# Patient Record
Sex: MALE | Race: WHITE | Employment: FULL TIME | URBAN - METROPOLITAN AREA
[De-identification: names, ages, dates, MRNs, and addresses within clinical notes are randomized per-mention and may not be internally consistent; named-entity substitution may affect disease eponyms.]

---

## 2020-05-13 ENCOUNTER — HOSPITAL ENCOUNTER (EMERGENCY)
Age: 35
Discharge: HOME | End: 2020-05-13
Payer: COMMERCIAL

## 2020-05-13 ENCOUNTER — APPOINTMENT (OUTPATIENT)
Dept: GENERAL RADIOLOGY | Age: 35
End: 2020-05-13
Payer: COMMERCIAL

## 2020-05-13 VITALS
HEIGHT: 73 IN | SYSTOLIC BLOOD PRESSURE: 132 MMHG | HEART RATE: 71 BPM | DIASTOLIC BLOOD PRESSURE: 80 MMHG | OXYGEN SATURATION: 99 % | TEMPERATURE: 98 F | WEIGHT: 167.11 LBS | RESPIRATION RATE: 16 BRPM | BODY MASS INDEX: 22.15 KG/M2

## 2020-05-13 PROCEDURE — 2580000003 HC RX 258: Performed by: PHYSICIAN ASSISTANT

## 2020-05-13 PROCEDURE — 96374 THER/PROPH/DIAG INJ IV PUSH: CPT

## 2020-05-13 PROCEDURE — 2500000003 HC RX 250 WO HCPCS: Performed by: PHYSICIAN ASSISTANT

## 2020-05-13 PROCEDURE — 73140 X-RAY EXAM OF FINGER(S): CPT

## 2020-05-13 PROCEDURE — 6360000002 HC RX W HCPCS: Performed by: PHYSICIAN ASSISTANT

## 2020-05-13 PROCEDURE — 99283 EMERGENCY DEPT VISIT LOW MDM: CPT

## 2020-05-13 PROCEDURE — 90471 IMMUNIZATION ADMIN: CPT | Performed by: PHYSICIAN ASSISTANT

## 2020-05-13 PROCEDURE — 90715 TDAP VACCINE 7 YRS/> IM: CPT | Performed by: PHYSICIAN ASSISTANT

## 2020-05-13 RX ORDER — CEPHALEXIN 500 MG/1
500 CAPSULE ORAL 4 TIMES DAILY
Qty: 28 CAPSULE | Refills: 0 | Status: ON HOLD | OUTPATIENT
Start: 2020-05-13 | End: 2020-05-19 | Stop reason: HOSPADM

## 2020-05-13 RX ORDER — LIDOCAINE HYDROCHLORIDE 10 MG/ML
5 INJECTION, SOLUTION INFILTRATION; PERINEURAL ONCE
Status: COMPLETED | OUTPATIENT
Start: 2020-05-13 | End: 2020-05-13

## 2020-05-13 RX ORDER — HYDROCODONE BITARTRATE AND ACETAMINOPHEN 5; 325 MG/1; MG/1
1 TABLET ORAL EVERY 6 HOURS PRN
Qty: 12 TABLET | Refills: 0 | Status: SHIPPED | OUTPATIENT
Start: 2020-05-13 | End: 2020-05-16

## 2020-05-13 RX ADMIN — TETANUS TOXOID, REDUCED DIPHTHERIA TOXOID AND ACELLULAR PERTUSSIS VACCINE, ADSORBED 0.5 ML: 5; 2.5; 8; 8; 2.5 SUSPENSION INTRAMUSCULAR at 18:19

## 2020-05-13 RX ADMIN — LIDOCAINE HYDROCHLORIDE 5 ML: 10 INJECTION, SOLUTION INFILTRATION; PERINEURAL at 15:43

## 2020-05-13 RX ADMIN — CEFAZOLIN 2 G: 1 INJECTION, POWDER, FOR SOLUTION INTRAMUSCULAR; INTRAVENOUS at 17:26

## 2020-05-13 ASSESSMENT — PAIN DESCRIPTION - LOCATION: LOCATION: FINGER (COMMENT WHICH ONE)

## 2020-05-13 ASSESSMENT — ENCOUNTER SYMPTOMS
NAUSEA: 0
ABDOMINAL PAIN: 0
VOMITING: 0

## 2020-05-13 ASSESSMENT — PAIN SCALES - GENERAL
PAINLEVEL_OUTOF10: 0
PAINLEVEL_OUTOF10: 0
PAINLEVEL_OUTOF10: 8

## 2020-05-13 ASSESSMENT — PAIN DESCRIPTION - ORIENTATION: ORIENTATION: RIGHT

## 2020-05-13 ASSESSMENT — PAIN DESCRIPTION - DESCRIPTORS: DESCRIPTORS: DISCOMFORT

## 2020-05-13 ASSESSMENT — PAIN DESCRIPTION - PAIN TYPE: TYPE: ACUTE PAIN

## 2020-05-13 NOTE — ED NOTES
Wound cleaned and rinsed. Nail completely gone and tip of finger split in areas. To denied pain throughout. Tolerated well.       Michael Shukla RN  05/13/20 6988

## 2020-05-13 NOTE — ED PROVIDER NOTES
[address]  [address] [address]  Dept: [phone]  Loc: [phone]  eMERGENCYdEPARTMENT eNCOUnter      Pt Name: Rafal Townsend  MRN: [id number]  Kwadwogfvinay 1985  Date of evaluation: 5/13/2020  Provider:Melissa Rod PA-C    CHIEF COMPLAINT       Chief Complaint   Patient presents with    Finger Injury     pt states smashed right thumb in between 2 pieces of steel today at work       HISTORY OF PRESENT ILLNESS  (Location/Symptom, Timing/Onset, Context/Setting, Quality, Duration,Modifying Factors, Severity.)   Rafal Townsend is a 37 y.o. male who presents to the emergency department by private vehicle complaining of right thumb injury. Patient is ambidextrous. He writes with his left hand. States he does some tasks with his right hand. At work this afternoon he smashed his right distal thumb between 2 pieces of steel. Patient denies any numbness or tingling to digit. No other injury sustained. No other complaints this time. Has not taken thing for pain. Nursing Notes were reviewedand agreed with or any disagreements were addressed in the HPI. REVIEW OF SYSTEMS    (2-9 systems for level 4, 10 or more for level 5)     Review of Systems   Constitutional: Negative for chills and fever. HENT: Negative. Cardiovascular: Negative. Gastrointestinal: Negative for abdominal pain, nausea and vomiting. Genitourinary: Negative. Musculoskeletal: Negative for arthralgias and myalgias. Skin: Positive for wound. Neurological: Negative for dizziness and light-headedness. Psychiatric/Behavioral: Negative for behavioral problems and confusion. Except as noted above the remainder of the review of systems was reviewed and negative. PAST MEDICAL HISTORY   History reviewed. No pertinent past medical history. SURGICAL HISTORY     History reviewed. No pertinent surgical history.     CURRENT MEDICATIONS [unfilled]    ALLERGIES     Patient has no known allergies. FAMILY HISTORY     History reviewed. No pertinent family history. No family status information on file. SOCIAL HISTORY      reports that he has been smoking cigarettes. He has a 5.00 pack-year smoking history. He has never used smokeless tobacco. He reports previous alcohol use. PHYSICAL EXAM    (up to 7 for level 4, 8 or more for level 5)     ED Triage Vitals [05/13/20 1513]   Enc Vitals Group      BP (!) 143/65      Pulse 72      Resp 17      Temp 98 °F (36.7 °C)      Temp Source Oral      SpO2 99 %      Weight 167 lb 1.7 oz (75.8 kg)      Height 6' 1\" (1.854 m)      Head Circumference       Peak Flow       Pain Score       Pain Loc       Pain Edu? Excl. in [address]? Physical Exam  Vitals signs reviewed. Constitutional:       Appearance: Normal appearance. HENT:      Head: Normocephalic and atraumatic. Pulmonary:      Effort: Pulmonary effort is normal. No respiratory distress. Musculoskeletal:      Comments: RUE: Distal thumb injury as noted above. Able to flex at IP and MCP joint, sensation present in remaining aspect of digit. Radial pulse +2. Remainder of hand with no other injury noted. Skin:     General: Skin is warm. Comments: See right thumb image below   Neurological:      General: No focal deficit present. Mental Status: He is alert.    Psychiatric:         Mood and Affect: Mood normal.         Behavior: Behavior normal.                           DIAGNOSTIC RESULTS     EKG: All EKG's are interpreted by the Emergency Department Physician who either signs or Co-signs this chart in the absence of a cardiologist.    RADIOLOGY:   Non-plain film images such as CT, Ultrasound and MRI are read by the radiologist. Plain radiographic images are visualized and preliminarilyinterpreted by the emergency physician with the below findings:    Interpretation per the Radiologist below,if available at the time of necessitate immediate return to the ED were discussed. The plan is to discharge from the ED at this time, and the patient is in stable condition. The patient acknowledged understanding is agreeable with this plan. CONSULTS:  None    PROCEDURES:  Procedures    Digital block done to right thumb.  3 to 4 cc 1% plain lidocaine injected to lateral and medial base of right thumb. Needle was injected, aspiration showed no blood, lidocaine then injected. No complications with procedure. Total block achieved    FINAL IMPRESSION      1. Open fracture of tuft of distal phalanx of thumb          DISPOSITION/PLAN   [unfilled]    PATIENT REFERRED TO:  [facility]  [address] [address]  [phone]  Go to   If symptoms worsen    *[address] [place] [address] [address]  [phone]    Call in 1 day  For follow up and reevaluation. Santana Wells MD  [address] [place] [address]  [address] [facility]  [phone]    Go in 2 days  For follow up and reevaluation at 10 am in office. Call number to confirm details. DISCHARGE MEDICATIONS:  New Prescriptions    CEPHALEXIN (KEFLEX) 500 MG CAPSULE    Take 1 capsule by mouth 4 times daily for 7 days    HYDROCODONE-ACETAMINOPHEN (NORCO) 5-325 MG PER TABLET    Take 1 tablet by mouth every 6 hours as needed for Pain for up to 3 days.        (Please note that portions of this note were completed with a voice recognition program.  Efforts were made to edit the dictations but occasionally words are mis-transcribed.)    LAZARO Ramos PA-C  05/13/20 [facility] [address], [place]  05/13/20 1740

## 2020-05-15 ENCOUNTER — VIRTUAL VISIT (OUTPATIENT)
Dept: ORTHOPEDIC SURGERY | Age: 35
End: 2020-05-15
Payer: COMMERCIAL

## 2020-05-15 VITALS — WEIGHT: 167.11 LBS | HEIGHT: 73 IN | BODY MASS INDEX: 22.15 KG/M2

## 2020-05-15 PROCEDURE — 99203 OFFICE O/P NEW LOW 30 MIN: CPT | Performed by: ORTHOPAEDIC SURGERY

## 2020-05-15 NOTE — Clinical Note
Consent form for [place]   to sign and return.  Please scan signed consent into chart before Day of Surgery

## 2020-05-15 NOTE — PROGRESS NOTES
TELEHEALTH EVALUATION -- Audio/Visual (During DTHES-45 public health emergency)    I have explained to Mr. Polly Alan that a Physical Examination is inherently limited by the nature of telemedicine and that this may lead to delayed or inadequate diagnosis. He is also explained that he may require a higher level of care if a diagnosis can not be reasonably established with a virtual visit. Mr. Polly Alan has provided his Consent to proceed with a Virtual Visit today. Mr. Polly Alan was personally present on the video link with me today. Mr. Polly Alan appeared to be in his own private environment during the visit, while I conducted this visit from my office. This visit was completed virtually using the [employer] platform. ------------------------------------------------------------------------------------------------------------------------    Mr. Polly Alan is a 37 y.o. left handed   who is seen today in Hand Surgical evaluation. He is seen today regarding an injury occurring on May 13th, 2020. He reports injuring his right Thumb, having had it caught between two pieces of steel in a machine  at Work. At the time of injury, there was clear distal Thumb loss. He was seen for Emergency evaluation elsewhere, radiographs were obtained & he has been immobilized. By report, his skin injury was not repaired after the wound was thoroughly cleansed. The nail structures were near totally lost in the injury and could not be repair at the time. He reports moderate pain located in the Distal aspect of the Thumb, no tenderness of the wrist or elbow. He notes today, no neurologic symptoms in the Whole Hand. Symptoms show no change over time. The patient's , past medical history, medications, allergies,  family history, social history, and review of systems have been updated, reviewed and have been scanned into the chart today.     Physical Exam:  Mr. Conrado Handley most recent vitals:  Vitals  Height: 6' 0.99\" (185.4 cm)  Weight: 167 lb 1.7 oz (75.8 kg)    By appearance, Mr. Dusty Mendez is well nourished, verbally he is oriented to person, place & time. He demonstrates appropriate mood and affect as well as normal station. General:  ENT: Atraumatic, normocephalic, normal appearing Oral Mucosa  Eyes: Sclera anicteric, Pupils equal and reactive. Extraocular movements intact. Neck: normal range of motion. No deformity  CV: No Jugular Venous Distention. Pulmonary: Regular Respiratory rate, no respiratory distress, no retractions. Hand and Upper Extremity:  Skin: There is dorsally biased Oblique distal loss of the Distal right Thumb closest to the eponychial margin which has not been sutured. The nail plate is absent with exposed Distal Phalanx . No other digits show sign of skin injury bilaterally. Digital range of motion is limited by pain in the injured digit on the Right, normal on the Left. FPL function is Difficult to determine clinically, EPL function is Intact, common extensor function is Intact. Wrist range of motion is (by visual estimation) without significant limitation bilaterally  There is no evidence of visible joint instability bilaterally. Sensation is subjectively decreased in the Thumb otherwise normal bilaterally   Vascular examination reveals (by visual estimation) good color on the Right, normal on the Left  Swelling (by visual estimation) is moderate in the Right, Thumb otherwise normal bilaterally  Muscular bulk and contour is (by visual estimation) Normal bilaterally. Clinical Photos:    ER PHOTOS:        Skin condition today-          Attempted Thumb active flexion limited by pain and open wound-        Radiographic Evaluation:  Radiographs are reviewed  today (2 views of the right Thumb). They do demonstrate evidence of an acute fracture of the Distal Phalanx of the Thumb with distal  Bony loss.   There is mild comminution, 0 degrees of angular potential complications, limitations, expectations, alternatives, and risks of the procedure. He has had full opportunity to ask their questions. I have answered them all to his satisfaction. I feel that the patient and any present family members do understand our discussion today and he has provided informed consent for Irrigation & Debridement with revision Amputation with Bone Shortening, Closure and possible Nail Ablation of his  right  Thumb injuries. We have discussed the specific limitations and risks of hospital and/or office based treatment at this time due to the COVID-19 pandemic      He is appropriately immobilized and protected until the time of the scheduled surgery. Mr. Dionne Reyes has been given a full verbal list of instructions and precautions related to his present condition. I have asked him to followup with me in the office at the prescribed time. He is also specifically requested to call or return to the office sooner if his symptoms change or worsen prior to the next scheduled appointment.              ------------------------------------------------------------------------------------------------------------------------    Pursuant to the emergency declaration under the [facility], [facility] waiver authority and the [employer] Act, this Virtual  Visit was conducted, with patient's consent, to reduce the patient's risk of exposure to COVID-19 and provide continuity of care for an established patient. Services were provided through a video synchronous discussion virtually to substitute for in-person clinic visit.

## 2020-05-15 NOTE — LETTER
_______________________           5/15/20                              Witness     Signature Of Patient         Date        Ether Jessica Garsia                                                 Informing Physician                                           Signature of Informing Physician                              If patient is unable to sign or is a minor, complete one of the following:    (A)  Patient is a minor   years of age. (B)  Patient is unable to sign because: The undersigned represents that he or she is duly authorized to execute this consent for and on behalf of the above named patient. Witness               o  Parent  o  Guardian   o  Spouse       o  Other (specify)                                                          Patient Name: Payton Escalona  Patient YOB: 1985    Dr. Vanda Abraham' Return To Work Policy  Regarding your ability to return to work after surgery or injury, Dr. Vanda Abraham will not state that any patient is off of work or cannot work at all. He will place you on restrictions after your surgical procedure or injury. This means that you will be allowed to return to work the day after your office visit or surgery with restrictions. Depending on the details of your particular situation, Dr. Vanda Abraham may state that you will have either light use or no use of your hand for a specific number of weeks. It is your obligation to communicate with your employer regarding your restrictions. It is your employer's decision as to whether they will accommodate your restrictions (i.e. allow you to come to work in your restricted capacity) or to not allow you to return to work under your restrictions. Dr. Vanda Abraham does not participate in making this decision and cannot influence your employer regarding their decision.   If you do not communicate your restrictions to your employer, or if you do not present to work as you are scheduled to, Dr. Vanda Abraham will not provide an

## 2020-05-18 ENCOUNTER — ANESTHESIA EVENT (OUTPATIENT)
Dept: OPERATING ROOM | Age: 35
End: 2020-05-18
Payer: COMMERCIAL

## 2020-05-18 ENCOUNTER — TELEPHONE (OUTPATIENT)
Dept: ORTHOPEDIC SURGERY | Age: 35
End: 2020-05-18

## 2020-05-18 RX ORDER — HYDROCODONE BITARTRATE AND ACETAMINOPHEN 5; 325 MG/1; MG/1
1 TABLET ORAL EVERY 6 HOURS PRN
Status: ON HOLD | COMMUNITY
End: 2020-05-19 | Stop reason: HOSPADM

## 2020-05-19 ENCOUNTER — ANESTHESIA (OUTPATIENT)
Dept: OPERATING ROOM | Age: 35
End: 2020-05-19
Payer: COMMERCIAL

## 2020-05-19 ENCOUNTER — HOSPITAL ENCOUNTER (OUTPATIENT)
Age: 35
Setting detail: OUTPATIENT SURGERY
Discharge: HOME | End: 2020-05-19
Attending: ORTHOPAEDIC SURGERY | Admitting: ORTHOPAEDIC SURGERY
Payer: COMMERCIAL

## 2020-05-19 VITALS
OXYGEN SATURATION: 97 % | HEART RATE: 63 BPM | BODY MASS INDEX: 21.43 KG/M2 | DIASTOLIC BLOOD PRESSURE: 62 MMHG | SYSTOLIC BLOOD PRESSURE: 107 MMHG | RESPIRATION RATE: 14 BRPM | TEMPERATURE: 97 F | HEIGHT: 73 IN | WEIGHT: 161.71 LBS

## 2020-05-19 VITALS
DIASTOLIC BLOOD PRESSURE: 50 MMHG | SYSTOLIC BLOOD PRESSURE: 101 MMHG | RESPIRATION RATE: 7 BRPM | OXYGEN SATURATION: 98 %

## 2020-05-19 LAB — SARS-COV-2, NAAT: NOT DETECTED

## 2020-05-19 PROCEDURE — 2500000003 HC RX 250 WO HCPCS: Performed by: NURSE ANESTHETIST, CERTIFIED REGISTERED

## 2020-05-19 PROCEDURE — 3600000015 HC SURGERY LEVEL 5 ADDTL 15MIN: Performed by: ORTHOPAEDIC SURGERY

## 2020-05-19 PROCEDURE — 6360000002 HC RX W HCPCS: Performed by: NURSE ANESTHETIST, CERTIFIED REGISTERED

## 2020-05-19 PROCEDURE — 3600000005 HC SURGERY LEVEL 5 BASE: Performed by: ORTHOPAEDIC SURGERY

## 2020-05-19 PROCEDURE — 3700000000 HC ANESTHESIA ATTENDED CARE: Performed by: ORTHOPAEDIC SURGERY

## 2020-05-19 PROCEDURE — 7100000001 HC PACU RECOVERY - ADDTL 15 MIN: Performed by: ORTHOPAEDIC SURGERY

## 2020-05-19 PROCEDURE — 2500000003 HC RX 250 WO HCPCS: Performed by: ORTHOPAEDIC SURGERY

## 2020-05-19 PROCEDURE — 6360000002 HC RX W HCPCS: Performed by: ORTHOPAEDIC SURGERY

## 2020-05-19 PROCEDURE — 7100000010 HC PHASE II RECOVERY - FIRST 15 MIN: Performed by: ORTHOPAEDIC SURGERY

## 2020-05-19 PROCEDURE — 2580000003 HC RX 258: Performed by: ORTHOPAEDIC SURGERY

## 2020-05-19 PROCEDURE — 7100000011 HC PHASE II RECOVERY - ADDTL 15 MIN: Performed by: ORTHOPAEDIC SURGERY

## 2020-05-19 PROCEDURE — 2580000003 HC RX 258: Performed by: ANESTHESIOLOGY

## 2020-05-19 PROCEDURE — 2709999900 HC NON-CHARGEABLE SUPPLY: Performed by: ORTHOPAEDIC SURGERY

## 2020-05-19 PROCEDURE — U0002 COVID-19 LAB TEST NON-CDC: HCPCS

## 2020-05-19 PROCEDURE — 3700000001 HC ADD 15 MINUTES (ANESTHESIA): Performed by: ORTHOPAEDIC SURGERY

## 2020-05-19 PROCEDURE — 7100000000 HC PACU RECOVERY - FIRST 15 MIN: Performed by: ORTHOPAEDIC SURGERY

## 2020-05-19 RX ORDER — ONDANSETRON 2 MG/ML
4 INJECTION INTRAMUSCULAR; INTRAVENOUS
Status: DISCONTINUED | OUTPATIENT
Start: 2020-05-19 | End: 2020-05-19 | Stop reason: HOSPADM

## 2020-05-19 RX ORDER — CEPHALEXIN 500 MG/1
500 CAPSULE ORAL 4 TIMES DAILY
Qty: 28 CAPSULE | Refills: 0 | Status: SHIPPED | OUTPATIENT
Start: 2020-05-19 | End: 2020-05-26

## 2020-05-19 RX ORDER — OXYCODONE HYDROCHLORIDE AND ACETAMINOPHEN 5; 325 MG/1; MG/1
2 TABLET ORAL PRN
Status: DISCONTINUED | OUTPATIENT
Start: 2020-05-19 | End: 2020-05-19 | Stop reason: HOSPADM

## 2020-05-19 RX ORDER — MAGNESIUM HYDROXIDE 1200 MG/15ML
LIQUID ORAL CONTINUOUS PRN
Status: COMPLETED | OUTPATIENT
Start: 2020-05-19 | End: 2020-05-19

## 2020-05-19 RX ORDER — SODIUM CHLORIDE 9 MG/ML
INJECTION, SOLUTION INTRAVENOUS CONTINUOUS
Status: DISCONTINUED | OUTPATIENT
Start: 2020-05-19 | End: 2020-05-19 | Stop reason: HOSPADM

## 2020-05-19 RX ORDER — SODIUM CHLORIDE 0.9 % (FLUSH) 0.9 %
10 SYRINGE (ML) INJECTION PRN
Status: DISCONTINUED | OUTPATIENT
Start: 2020-05-19 | End: 2020-05-19 | Stop reason: HOSPADM

## 2020-05-19 RX ORDER — HYDROCODONE BITARTRATE AND ACETAMINOPHEN 5; 325 MG/1; MG/1
1 TABLET ORAL EVERY 6 HOURS PRN
Qty: 20 TABLET | Refills: 0 | Status: SHIPPED | OUTPATIENT
Start: 2020-05-19 | End: 2020-05-26

## 2020-05-19 RX ORDER — PROPOFOL 10 MG/ML
INJECTION, EMULSION INTRAVENOUS PRN
Status: DISCONTINUED | OUTPATIENT
Start: 2020-05-19 | End: 2020-05-19 | Stop reason: SDUPTHER

## 2020-05-19 RX ORDER — MIDAZOLAM HYDROCHLORIDE 1 MG/ML
INJECTION INTRAMUSCULAR; INTRAVENOUS PRN
Status: DISCONTINUED | OUTPATIENT
Start: 2020-05-19 | End: 2020-05-19 | Stop reason: SDUPTHER

## 2020-05-19 RX ORDER — FENTANYL CITRATE 50 UG/ML
INJECTION, SOLUTION INTRAMUSCULAR; INTRAVENOUS PRN
Status: DISCONTINUED | OUTPATIENT
Start: 2020-05-19 | End: 2020-05-19 | Stop reason: SDUPTHER

## 2020-05-19 RX ORDER — SODIUM CHLORIDE 0.9 % (FLUSH) 0.9 %
10 SYRINGE (ML) INJECTION EVERY 12 HOURS SCHEDULED
Status: DISCONTINUED | OUTPATIENT
Start: 2020-05-19 | End: 2020-05-19 | Stop reason: HOSPADM

## 2020-05-19 RX ORDER — OXYCODONE HYDROCHLORIDE AND ACETAMINOPHEN 5; 325 MG/1; MG/1
1 TABLET ORAL PRN
Status: DISCONTINUED | OUTPATIENT
Start: 2020-05-19 | End: 2020-05-19 | Stop reason: HOSPADM

## 2020-05-19 RX ORDER — FENTANYL CITRATE 50 UG/ML
25 INJECTION, SOLUTION INTRAMUSCULAR; INTRAVENOUS EVERY 5 MIN PRN
Status: DISCONTINUED | OUTPATIENT
Start: 2020-05-19 | End: 2020-05-19 | Stop reason: HOSPADM

## 2020-05-19 RX ORDER — LIDOCAINE HYDROCHLORIDE 20 MG/ML
INJECTION, SOLUTION EPIDURAL; INFILTRATION; INTRACAUDAL; PERINEURAL PRN
Status: DISCONTINUED | OUTPATIENT
Start: 2020-05-19 | End: 2020-05-19 | Stop reason: SDUPTHER

## 2020-05-19 RX ORDER — DEXAMETHASONE SODIUM PHOSPHATE 4 MG/ML
INJECTION, SOLUTION INTRA-ARTICULAR; INTRALESIONAL; INTRAMUSCULAR; INTRAVENOUS; SOFT TISSUE PRN
Status: DISCONTINUED | OUTPATIENT
Start: 2020-05-19 | End: 2020-05-19 | Stop reason: SDUPTHER

## 2020-05-19 RX ORDER — ONDANSETRON 2 MG/ML
INJECTION INTRAMUSCULAR; INTRAVENOUS PRN
Status: DISCONTINUED | OUTPATIENT
Start: 2020-05-19 | End: 2020-05-19 | Stop reason: SDUPTHER

## 2020-05-19 RX ORDER — BUPIVACAINE HYDROCHLORIDE 5 MG/ML
INJECTION, SOLUTION EPIDURAL; INTRACAUDAL
Status: COMPLETED | OUTPATIENT
Start: 2020-05-19 | End: 2020-05-19

## 2020-05-19 RX ADMIN — ONDANSETRON 4 MG: 2 INJECTION INTRAMUSCULAR; INTRAVENOUS at 10:50

## 2020-05-19 RX ADMIN — PROPOFOL 300 MG: 10 INJECTION, EMULSION INTRAVENOUS at 10:45

## 2020-05-19 RX ADMIN — FENTANYL CITRATE 25 MCG: 50 INJECTION INTRAMUSCULAR; INTRAVENOUS at 11:07

## 2020-05-19 RX ADMIN — SODIUM CHLORIDE: 9 INJECTION, SOLUTION INTRAVENOUS at 10:40

## 2020-05-19 RX ADMIN — DEXAMETHASONE SODIUM PHOSPHATE 8 MG: 4 INJECTION, SOLUTION INTRAMUSCULAR; INTRAVENOUS at 10:50

## 2020-05-19 RX ADMIN — FENTANYL CITRATE 25 MCG: 50 INJECTION INTRAMUSCULAR; INTRAVENOUS at 10:46

## 2020-05-19 RX ADMIN — LIDOCAINE HYDROCHLORIDE 5 ML: 20 INJECTION, SOLUTION EPIDURAL; INFILTRATION; INTRACAUDAL; PERINEURAL at 10:45

## 2020-05-19 RX ADMIN — SODIUM CHLORIDE: 9 INJECTION, SOLUTION INTRAVENOUS at 09:36

## 2020-05-19 RX ADMIN — CEFAZOLIN 2 G: 10 INJECTION, POWDER, FOR SOLUTION INTRAVENOUS at 10:42

## 2020-05-19 RX ADMIN — MIDAZOLAM 2 MG: 1 INJECTION INTRAMUSCULAR; INTRAVENOUS at 10:40

## 2020-05-19 ASSESSMENT — PULMONARY FUNCTION TESTS
PIF_VALUE: 15
PIF_VALUE: 10
PIF_VALUE: 1
PIF_VALUE: 2
PIF_VALUE: 14
PIF_VALUE: 1
PIF_VALUE: 3
PIF_VALUE: 10
PIF_VALUE: 2
PIF_VALUE: 15
PIF_VALUE: 2
PIF_VALUE: 13
PIF_VALUE: 3
PIF_VALUE: 10
PIF_VALUE: 2
PIF_VALUE: 0
PIF_VALUE: 3
PIF_VALUE: 10
PIF_VALUE: 14
PIF_VALUE: 3
PIF_VALUE: 2
PIF_VALUE: 14
PIF_VALUE: 0
PIF_VALUE: 14
PIF_VALUE: 3
PIF_VALUE: 3
PIF_VALUE: 9

## 2020-05-19 ASSESSMENT — PAIN DESCRIPTION - ONSET
ONSET: ON-GOING

## 2020-05-19 ASSESSMENT — PAIN DESCRIPTION - PAIN TYPE
TYPE: SURGICAL PAIN

## 2020-05-19 ASSESSMENT — PAIN SCALES - GENERAL
PAINLEVEL_OUTOF10: 0
PAINLEVEL_OUTOF10: 2

## 2020-05-19 ASSESSMENT — PAIN DESCRIPTION - ORIENTATION
ORIENTATION: RIGHT

## 2020-05-19 ASSESSMENT — PAIN - FUNCTIONAL ASSESSMENT
PAIN_FUNCTIONAL_ASSESSMENT: 0-10
PAIN_FUNCTIONAL_ASSESSMENT: ACTIVITIES ARE NOT PREVENTED
PAIN_FUNCTIONAL_ASSESSMENT: ACTIVITIES ARE NOT PREVENTED

## 2020-05-19 ASSESSMENT — PAIN DESCRIPTION - DESCRIPTORS
DESCRIPTORS: THROBBING

## 2020-05-19 ASSESSMENT — LIFESTYLE VARIABLES: SMOKING_STATUS: 1

## 2020-05-19 ASSESSMENT — PAIN DESCRIPTION - FREQUENCY
FREQUENCY: CONTINUOUS

## 2020-05-19 ASSESSMENT — PAIN DESCRIPTION - LOCATION
LOCATION: FINGER (COMMENT WHICH ONE)
LOCATION: OTHER (COMMENT)

## 2020-05-19 ASSESSMENT — PAIN DESCRIPTION - PROGRESSION
CLINICAL_PROGRESSION: NOT CHANGED

## 2020-05-19 NOTE — OP NOTE
OPERATIVE REPORT          Patient:  Polina Harrison    YOB: 1985  Date of Service:  5/19/2020    Location:  [facility]      Preoperative Diagnosis:  Right Thumb Distal Amputation    Postoperative Diagnosis:  Same. Procedure:  Irrigation and Debridement with bone shortening and revision amputation of Right Thumb at the mid-Distal Phalanx  level. Surgeon:    Norman Alarcon. Mir Myers MD    Surgical Assistant:    SAGAR Guajardo Assistant    Anesthesia:  General    Blood Loss:  Minimal.     Complications:  None. Tourniquet Time:  14 minutes. Indications:  Mr. Polina Harrison is a 37y.o. year old male who sustained traumatic distal injury to the   Right Thumb. I have discussed with him preoperatively the complications, limitations, expectations, alternatives and risks of the surgical care including the shortening and revision amputation of the finger which he understood. All of his questions have been fully answered, and Mr. Polina Harrison has provided written informed consent to proceed. The patient was counseled at length about the risks of franklin Covid-19 during their perioperative period and any recovery window from their procedure. The patient was made aware that franklin Covid-19  may worsen their prognosis for recovering from their procedure  and lend to a higher morbidity and/or mortality risk. All material risks, benefits, and reasonable alternatives including postponing the procedure were discussed. The patient does wish to proceed with the procedure at this time. After written consent was obtained and the proper operative site was identified and marked, Mr. Polina Harrison was brought to the operating room, placed in the supine position on the operating room table with the Right arm extended upon a hand table.   The planned anesthesia was administered by the anesthesia service and the Right upper extremity was prepped and draped in the usual sterile fashion. After Eshmarch exsanguination, the pneumo-tourniquet was inflated to 250 milimeters of mercury about the upper arm. The traumatic wound was explored and any nonvital or obviously damaged or infected tissue was sharply removed down to the level of the bone. Revision amputation level was selected and incision was fashioned directly about the  Thumb at the planned level of amputation. Dissection was carried carefully through the subcutaneous tissue identifying and protecting the neurovascular structures. The skin flaps were raised full thickness and were carefully retracted. The Neurovascular bundles were isolated and resected proximally enough to avoid painful digital neuroma formation. The Bone was transected at the planned level and the end was contoured to a comfortable shape. The nail structures were partially present proximally and, in interest in maintaining maximal thumb length, were preserved. . The wound was irrigated copiously with sterile saline for irrigation. The pneumo-tourniquet was deflated after a period of 14 minutes elevation. The fingers & flap were immediately pink and well perfused. Hemostasis was easily obtained with direct pressure and electrocautery. The flaps were inset and approximated and the wound was closed with interrupted absorbable sutures in the skin. The wound was dressed with adaptic, dry sterile dressings, and a very well padded hand and finger dressing was applied. Mr. Amol Avina  was awakened from anesthesia having tolerated the procedure without apparent complication, and was returned to the recovery room in stable condition. At the conclusion of the procedure all needle, instrument, and sponge counts were correct. Fortino Toney MD   5/19/2020 , 11:13 AM

## 2020-05-19 NOTE — PROGRESS NOTES
Awake and oriented, vital signs stable, IV infusing without complications, pain tolerable.
C-Difficile admission screening and protocol:     * Admitted with diarrhea? YES____    NO_X____     *Prior history of C-Diff. In last 3 months? YES____   NO_X____     *Antibiotic use in the past 6-8 weeks? NO______YES_X___                 If yes which  ANTIBIOTIC AND REASON___X--fracture___     *Prior hospitalization or nursing home in the last month?  YES____   NO__X__
Preoperative Screening for Elective Surgery/Invasive Procedures While COVID-19 present in the community     Have you tested positive or have been told to self-isolate for COVID-19 like symptoms within the past 28 days? --to be done DOS   Do you currently have any of the following symptoms? NO  o Fever >100.0 F or 99.9 F in immunocompromised patients? o New onset cough, shortness of breath or difficulty breathing?  o New onset sore throat, myalgia (muscle aches and pains), headache, loss of taste/smell or diarrhea?  Have you had a potential exposure to COVID-19 within the past 14 days by: NO  o Close contact with a confirmed case? o Close contact with a healthcare worker,  or essential infrastructure worker (grocery store, [employer], gas station, public utilities or transportation)? o Do you reside in a congregate setting such as; skilled nursing facility, adult home, correctional facility, homeless shelter or other institutional setting?  o Have you had recent travel to a known COVID-19 hotspot? Indicate if the patient has a positive screen by answering yes to one or more of the above questions. Patients who test positive or screen positive prior to surgery or on the day of surgery should be evaluated in conjunction with the surgeon/proceduralist/anesthesiologist to determine the urgency of the procedure.
Pt up to chair. Tolerated po well. Discharge instructions given to pts mother via phone. Verbalized understanding.
toes      For your safety, please do not wear any jewelry or body piercing's on the day of surgery. All jewelry must be removed. If you have dentures, they will be removed before going to operating room. For your convenience, we will provide you with a container. If you wear contact lenses or glasses, they will be removed, please bring a case for them. If you have a living will and a durable power of  for healthcare, please bring in a copy. As part of our patient safety program to minimize surgical site infections, we ask you to do the following:    · Please notify your surgeon if you develop any illness between         now and the  day of your surgery. · This includes a cough, cold, fever, sore throat, nausea,         or vomiting, and diarrhea, etc.  ·  Please notify your surgeon if you experience dizziness, shortness         of breath or blurred vision between now and the time of your surgery. Do not shave your operative site 96 hours prior to surgery. For face and neck surgery, men may use an electric razor 48 hours   prior to surgery. You may shower the night before surgery or the morning of   your surgery with an antibacterial soap. You will need to bring a photo ID and insurance card    [facility] has an onsite pharmacy, would you like to utilize our pharmacy     If you will be staying overnight and use a C-pap machine, please bring   your C-pap to hospital     Our goal is to provide you with excellent care, therefore, visitors will be limited to two(2) in the room at a time so that we may focus on providing this care for you. Please contact pre-admission testing if you have any further questions. [facility] phone number:  [address] PAT fax number:  [phone]  Please note these are generalized instructions for all surgical cases, you may be provided with more specific instructions according to your surgery.

## 2020-05-19 NOTE — H&P
Pre-operative Update of H&P:    I  have seen & examined . Agueda Albert related solely to his hand and upper extremity conditions, prior to the scheduled procedure on the date of his surgery. The indications for the planned surgical procedure & and his upper-extremity condition are unchanged.

## 2020-05-29 ENCOUNTER — OFFICE VISIT (OUTPATIENT)
Dept: ORTHOPEDIC SURGERY | Age: 35
End: 2020-05-29

## 2020-05-29 VITALS — HEIGHT: 73 IN | BODY MASS INDEX: 21.45 KG/M2 | WEIGHT: 161.82 LBS | TEMPERATURE: 99.1 F | RESPIRATION RATE: 16 BRPM

## 2020-05-29 PROBLEM — S68.119A AMPUTATION OF FINGER TIP: Status: ACTIVE | Noted: 2020-05-29

## 2020-05-29 PROCEDURE — 99024 POSTOP FOLLOW-UP VISIT: CPT | Performed by: PHYSICIAN ASSISTANT

## 2020-05-29 NOTE — PROGRESS NOTES
asked Mr. Gael Bowen to follow-up with me 4 weeks from now or to call me at that time if he has not been able to regain full painless range of motion and functional use of the injured extremity. He is also specifically instructed to return to the office or call for an appointment sooner if his symptoms are changing or worsening prior to that time.

## 2020-05-29 NOTE — PATIENT INSTRUCTIONS
Postoperative Instructions     Dr. Tarah Hairston. Ady        1. After bandages are removed one week from surgery, you may chose to wear a small bandage over the incision if you wish, though you do not need to. 2. Keep incision dry until sutures have fully dissolved  or it has been 10-14 days since your surgery. Thereafter, you may wash with mild soap and water and shower normally. 3. Once your stiches have fully disappeared & skin appears normal, you should begin gently massaging the incision with Vitamin E (may use Vitamin E lotion or contents of Vitamin E capsule). 4. Work hard on motion of the fingers and wrist, straightening each finger fully and bending each finger fully, bending wrist forward and bending wrist backwards. Do not be concerned if you experience discomfort. This will not damage the surgery. 5. You may begin using the hand as it feels comfortable beginning 12-14 days from the day of surgery. You may not feel entirely comfortable gripping or lifting heavy objects for several weeks. 6. You may expect to see some skin peel off around the incision. You may be left with a small area of pink baby skin. This is quite normal.    Thank you for choosing [facility] for your Hand and Upper Extremity needs. If we can be of any further assistance to you, please do not hesitate to contact us. Office Phone Number:  [phone]  or  [phone]       Patient instructions desensitization    Hand injuries are often very tender during the early healing phase. Often, tenderness in scars gets worse starting one to two weeks after injury or surgery. Unfortunately, this tenderness does not always go away by itself. The nerves in the hand are special and are more sensitive than other parts of the body. After any injury, the skin of the hand must get used to being touched again for the tenderness to go away.  If you do not touch the sore areas of your hand, they may remain very sensitive and tender. The techniques of PERCUSSION and FRICTION MASSAGE outlined in this pamphlet will help speed up the process of recovery from tenderness in your hands and fingers. The goal of these exercises is to make your wounds less tender. It is normal for these exercises to be somewhat uncomfortable while doing them or shortly afterwards. If the exercises are too painful, try using less pressure. If that does not work, then give yourself a several hour break and try again. If pain again is a problem, speak with your doctor or your therapist. These exercises will not be recommended until it is safe to do them. PERCUSSION (Tapping): This technique activates the automatic reflex which makes us ignore things which are very repetitive. This reflex will dull the tenderness in areas of your hand that are touched repeatedly. Here is how to do percussion:    1. Tap lightly on the area of your hand which is tender. You can tap on the sensitive area with a finger tip of your other hand or with a light object such as a pencil. 2. Find the spot which is the most tender. 3. Note the time, and begin to tap rapidly (2-3 times a second), lightly and continuously on the most tender area. .    4. Keep tapping without a break for three minutes or until you notice the feeling in the area change. The area may start to feel numb or it may simply feel a little bit less tender. 5. Take a minute rest and begin again. You may find that a different area is now the most tender spot. This exercise should be done as many times as possible during the day. It takes many thousands of taps to really change the tenderness in a sore area. The sooner you accumulate that many taps, the sooner your wounds will be more comfortable. FRICTION MASSAGE:    The goal of friction massage is to [facility] the scar tissue beneath the skin. As with percussion, it should be done many times during the day.  This exercise not only helps improve tenderness, but helps restore the contour of the skin to a more normal appearance. Here is how to do friction massage:    1. Place a finger tip of your other hand against the central area of the scar. 2. Mentally note four directions that the skin can be pushed sideways: near, far, left and right. 3. With your finger tip pressed firmly against the scar and without sliding, gently but steadily push the skin to one side as if you were trying to slide the skin off of the bone. Hold this position for five seconds. 4. Briefly relax and then repeat this maneuver in one of the other directions. Make sure you attempt to slide the skin in all four directions. 5. If the scar is wider than your finger tip, repeat this stretching exercise on every point of the scar. This exercise is done without any skin lubrication. Remember to do this exercise before applying any antibiotic ointment or moisturizing creams.

## 2020-06-01 ENCOUNTER — TELEPHONE (OUTPATIENT)
Dept: ORTHOPEDIC SURGERY | Age: 35
End: 2020-06-01

## 2020-06-16 ENCOUNTER — TELEPHONE (OUTPATIENT)
Dept: ORTHOPEDIC SURGERY | Age: 35
End: 2020-06-16

## 2020-06-16 NOTE — TELEPHONE ENCOUNTER
Pt called; has questions about obtaining his records for [facility] claim. Please advise.       Call [phone] to discuss

## 2020-07-17 ENCOUNTER — TELEPHONE (OUTPATIENT)
Dept: ORTHOPEDIC SURGERY | Age: 35
End: 2020-07-17

## 2020-07-17 NOTE — TELEPHONE ENCOUNTER
She had sent over the [employer] and the C30 and a portion for the doctor to fill in. She would like to know what the status is.   She can be reached at [phone]

## 2020-07-29 ENCOUNTER — TELEPHONE (OUTPATIENT)
Dept: ORTHOPEDIC SURGERY | Age: 35
End: 2020-07-29

## 2020-07-29 NOTE — TELEPHONE ENCOUNTER
AWILDA CALLED FROM [facility] REQUESTING  REPORT OF INJURY AND C30 FORMS BE FAXED BACK. XII:[phone].  CALL BACK [facility].[phone]

## 2020-08-03 ENCOUNTER — TELEPHONE (OUTPATIENT)
Dept: ORTHOPEDIC SURGERY | Age: 35
End: 2020-08-03

## (undated) DEVICE — SOLUTION PREP POVIDONE IOD FOR SKIN MUCOUS MEM PRIOR TO

## (undated) DEVICE — SHEET,DRAPE,53X77,STERILE: Brand: MEDLINE

## (undated) DEVICE — GLOVE SURG SZ 75 CRM LTX FREE POLYISOPRENE POLYMER BEAD ANTI

## (undated) DEVICE — GLOVE SURG SZ 65 CRM LTX FREE POLYISOPRENE POLYMER BEAD ANTI

## (undated) DEVICE — BANDAGE COMPR W2INXL5YD TAN BRTH SELF ADH WRP W/ HND TEAR

## (undated) DEVICE — MINOR SET UP PK

## (undated) DEVICE — ZIMMER® STERILE DISPOSABLE TOURNIQUET CUFF WITH PLC, DUAL PORT, SINGLE BLADDER, 18 IN. (46 CM)

## (undated) DEVICE — DRESSING PETRO W3XL3IN OIL EMUL N ADH GZ KNIT IMPREG CELOS

## (undated) DEVICE — SUTURE CHROMIC GUT SZ 4-0 L27IN ABSRB BRN FS-2 L19MM 3/8 635H

## (undated) DEVICE — NEEDLE HYPO 25GA L1.5IN BVL ORIENTED ECLIPSE

## (undated) DEVICE — GOWN SIRUS NONREIN XL W/TWL: Brand: MEDLINE INDUSTRIES, INC.

## (undated) DEVICE — UNDERGLOVE SURG SZ 8 FNGR THK0.21MIL GRN LTX BEAD CUF

## (undated) DEVICE — GOWN,SIRUS,POLYRNF,BRTHSLV,XL,30/CS: Brand: MEDLINE

## (undated) DEVICE — BANDAGE COMPR W4INXL15FT BGE E SGL LAYERED CLP CLSR

## (undated) DEVICE — SYRINGE MED 10ML LUERLOCK TIP W/O SFTY DISP

## (undated) DEVICE — NEEDLE HYPO 18GA L1.5IN THN WALL PIVOTING SHLD BVL ORIENTED

## (undated) DEVICE — DRAPE HND W114XL142IN BLU POLYPR W O PCH FEN CRD AND TB HLDR

## (undated) DEVICE — SPONGE GZ W4XL4IN COT 12 PLY TYP VII WVN C FLD DSGN

## (undated) DEVICE — SOLUTION SCRB 4OZ 10% POVIDONE IOD ANTIMIC BTL

## (undated) DEVICE — Z DISCONTINUED USE 2275676 GLOVE SURG SZ 65 L12IN FNGR THK87MIL DK GRN LTX FREE ISOLEX

## (undated) DEVICE — SOLUTION IV IRRIG 250ML ST LF 0.9% SODIUM 2F7122

## (undated) DEVICE — Z DISCONTINUED BY MEDLINE USE 2711682 TRAY SKIN PREP DRY W/ PREM GLV

## (undated) DEVICE — COVER LT HNDL BLU PLAS

## (undated) DEVICE — BANDAGE COMPR W4INXL12FT E DISP ESMARCH EVEN

## (undated) DEVICE — INTENDED FOR TISSUE SEPARATION, AND OTHER PROCEDURES THAT REQUIRE A SHARP SURGICAL BLADE TO PUNCTURE OR CUT.: Brand: BARD-PARKER ® STAINLESS STEEL BLADES